# Patient Record
Sex: MALE | Race: WHITE | NOT HISPANIC OR LATINO
[De-identification: names, ages, dates, MRNs, and addresses within clinical notes are randomized per-mention and may not be internally consistent; named-entity substitution may affect disease eponyms.]

---

## 2017-07-13 PROBLEM — Z00.00 ENCOUNTER FOR PREVENTIVE HEALTH EXAMINATION: Status: ACTIVE | Noted: 2017-07-13

## 2017-07-17 ENCOUNTER — APPOINTMENT (OUTPATIENT)
Dept: COLORECTAL SURGERY | Facility: CLINIC | Age: 62
End: 2017-07-17

## 2018-05-15 ENCOUNTER — APPOINTMENT (OUTPATIENT)
Dept: PAIN MANAGEMENT | Facility: CLINIC | Age: 63
End: 2018-05-15

## 2020-09-10 ENCOUNTER — APPOINTMENT (OUTPATIENT)
Dept: CARDIOLOGY | Facility: CLINIC | Age: 65
End: 2020-09-10
Payer: MEDICARE

## 2020-09-10 ENCOUNTER — NON-APPOINTMENT (OUTPATIENT)
Age: 65
End: 2020-09-10

## 2020-09-10 VITALS
RESPIRATION RATE: 16 BRPM | TEMPERATURE: 98 F | DIASTOLIC BLOOD PRESSURE: 97 MMHG | HEART RATE: 64 BPM | WEIGHT: 186 LBS | HEIGHT: 73 IN | SYSTOLIC BLOOD PRESSURE: 162 MMHG | BODY MASS INDEX: 24.65 KG/M2 | OXYGEN SATURATION: 98 %

## 2020-09-10 DIAGNOSIS — Z87.891 PERSONAL HISTORY OF NICOTINE DEPENDENCE: ICD-10-CM

## 2020-09-10 DIAGNOSIS — Z78.9 OTHER SPECIFIED HEALTH STATUS: ICD-10-CM

## 2020-09-10 DIAGNOSIS — G90.50 COMPLEX REGIONAL PAIN SYNDROME I, UNSPECIFIED: ICD-10-CM

## 2020-09-10 DIAGNOSIS — Z01.818 ENCOUNTER FOR OTHER PREPROCEDURAL EXAMINATION: ICD-10-CM

## 2020-09-10 DIAGNOSIS — M79.606 PAIN IN LEG, UNSPECIFIED: ICD-10-CM

## 2020-09-10 DIAGNOSIS — K40.90 UNILATERAL INGUINAL HERNIA, W/OUT OBSTRUCTION OR GANGRENE, NOT SPECIFIED AS RECURRENT: ICD-10-CM

## 2020-09-10 DIAGNOSIS — Z86.718 PERSONAL HISTORY OF OTHER VENOUS THROMBOSIS AND EMBOLISM: ICD-10-CM

## 2020-09-10 PROCEDURE — 93000 ELECTROCARDIOGRAM COMPLETE: CPT

## 2020-09-10 PROCEDURE — 99204 OFFICE O/P NEW MOD 45 MIN: CPT

## 2020-09-11 NOTE — REASON FOR VISIT
[FreeTextEntry1] : Patient presents here for cardiac evaluation anticipating a left inguinal hernia repair in the near future at [facility] with Dr. Willie Jimenez.  \par \par The patient has no preexisting history of any cardiovascular disease, however, has been carrying the diagnosis of hypertension, currently on no medical therapy and some borderline hyperlipidemia.  \par \par He denies any family history of premature coronary artery disease, smoking history, diabetes. \par \par He exercises avidly and regularly without any limitation whatsoever.  He denies any chest pain, palpitations, PND, orthopnea, edema, syncope or near-syncope. \par \par He is unware of what his home blood pressure runs.  \par \par He did undergo an echocardiogram at an outpatient facility where he was found to have some mild biatrial and right ventricular enlargement. \par \par The patient denies any other untoward cardiac symptoms or medical history of significance.  His major medical history is that of RSD in the right leg.  \par

## 2020-09-11 NOTE — ASSESSMENT
[FreeTextEntry1] : \par ECG shows normal sinus rhythm at 64 bpm.  Right ventricular conduction delay with slightly delayed R-wave progression.  No significant ST-T wave changes.  \par \par Laboratory data 7/6/20: \par Cholesterol 199\par HDL 44\par \par AIC 4.9\par Hemoglobin 15.9\par Creatinine 0.9\par \par Echocardiogram 7/9/20 [facility]:  LV at 50-55%.  Mild left atrial and right atrial enlargement.  Moderate right ventricular enlargement.  No significant valvular disease. \par \par Impression:\par 1.  65-year-old male with vigorously active lifestyle and no cardiac symptoms whatsoever. \par 2.  Blood pressure that probably is a bit elevated, currently on no pharmacotherapy, but has not been well-     documented as an outpatient and at home. \par 3.  Some borderline mild hyperlipidemia.\par 4.  Preop for a low-risk left inguinal herniorrhaphy. \par \par The patient does not have any obvious cardiac contraindication proceeding with this low-risk procedure. \par \par To determine whether or not he requires antihypertensive therapy, I suggested that he purchase a home monitor and create a 2-3 week list of blood pressures and averages of systolic and diastolic and report back in with them.   \par The abnormalities seen on the echocardiogram are mild and certainly do not prohibit his proceeding with the surgery. \par \par A stress test at this point in time, especially with a hernia, would not have any significant role in risk stratifying with regard to this low-risk procedure in a patient who is himself at low risk. \par \par I do think it might be reasonable for him to have a full exercise stress test once he is convalesced and healed from the surgery just to make sure that his exercise does not create an undue hypertensive blood pressure response. \par \par I explained all of the above to the patient and for right now, he is considered cardiovascular stable for herniorrhaphy. \par \par He will follow up with me with regard to his blood pressure measurements at home and determination of antihypertensive therapy preoperatively. \par \par He will watch his diet more carefully and reassess lipid management at a later date. \par \par A repeat echocardiogram is planned for summer of 2021.  I hope that this information is useful to you.  Feel free to contact me with regard to any questions. \par \par   \par    \par

## 2020-09-28 ENCOUNTER — RESULT REVIEW (OUTPATIENT)
Age: 65
End: 2020-09-28

## 2021-02-05 ENCOUNTER — APPOINTMENT (OUTPATIENT)
Dept: CARDIOLOGY | Facility: CLINIC | Age: 66
End: 2021-02-05

## 2021-02-15 ENCOUNTER — APPOINTMENT (OUTPATIENT)
Dept: CARDIOLOGY | Facility: CLINIC | Age: 66
End: 2021-02-15
Payer: MEDICARE

## 2021-02-15 VITALS
SYSTOLIC BLOOD PRESSURE: 160 MMHG | HEIGHT: 73 IN | WEIGHT: 184 LBS | OXYGEN SATURATION: 98 % | BODY MASS INDEX: 24.39 KG/M2 | TEMPERATURE: 97.5 F | RESPIRATION RATE: 16 BRPM | HEART RATE: 132 BPM | DIASTOLIC BLOOD PRESSURE: 87 MMHG

## 2021-02-15 PROCEDURE — 99215 OFFICE O/P EST HI 40 MIN: CPT

## 2021-02-15 PROCEDURE — 99072 ADDL SUPL MATRL&STAF TM PHE: CPT

## 2021-02-15 PROCEDURE — 93000 ELECTROCARDIOGRAM COMPLETE: CPT

## 2021-02-15 NOTE — REASON FOR VISIT
[FreeTextEntry1] : Patient presents here for cardiac evaluation after a left ear excision of a basal cell carcinoma repaired with a skin graft.  Procedure just performed 3 days ago on 2/12/2021 and was without cardiac incident or difficulty.\par Denies any postoperative chest pain shortness of breath palpitations or dizziness.\par \par He previously tolerated a left inguinal hernia repair in the near future at [facility] with Dr. Willie Jimenez.  \par \par The patient has no preexisting history of any cardiovascular disease, however, has been carrying the diagnosis of hypertension, currently on no medical therapy and some borderline hyperlipidemia.  \par \par He denies any family history of premature coronary artery disease, smoking history, diabetes. \par \par He exercises avidly and regularly without any limitation whatsoever.  He denies any chest pain, palpitations, PND, orthopnea, edema, syncope or near-syncope. \par \par He is unware of what his home blood pressure runs.  \par \par He did undergo an echocardiogram at an outpatient facility where he was found to have some mild biatrial and right ventricular enlargement. \par \par The patient denies any other untoward cardiac symptoms or medical history of significance.  His major medical history is that of RSD in the right leg.  \par

## 2021-02-15 NOTE — PHYSICAL EXAM
[FreeTextEntry1] :                    Well appearing and nourished with no obvious deformities or distress.\par \par Eyes: \par No conjunctival injection and no xanthelasmas.\par HEENT: \par Left ear with obvious postoperative findings from the excision and skin graft               normocephalic.Normal oral mucosa. No pallor or cyanosis\par Neck: \par No jugular venous distension. with normal A and V wave forms. No palpable adenopathy.\par Cardiovascular: \par Rapid and irregular S1, S2 and a grade 1/6 systolic murmur. Distal arterial pulses are normal. No significant peripheral edema.\par Pulmonary: \par Lungs are clear to auscultation and percussion. Normal respiratory pattern without any accessory muscle use\par Abdomen: \par Soft, non-tender ; no palpable organomegaly or masses.\par Extremities:\par No digital clubbing, cyanosis or ischemic changes.\par Skin: \par No skin lesions, rashes, ulcers or xanthomas.\par Psychiatric: \par Alert and oriented to person, place and time. Appropriate mood and affect.

## 2021-02-15 NOTE — HISTORY OF PRESENT ILLNESS
[FreeTextEntry1] : Home blood pressure record over the last 2 months reflects systolics generally ranging in the 130s to 150 range with an average systolic of 139

## 2021-02-15 NOTE — ASSESSMENT
[FreeTextEntry1] : \par ECG atrial fibrillation ventricular rate of 132 and minor T wave normalities.  Delayed R wave progression.\par \par Laboratory data 7/6/20: \par Cholesterol 199\par HDL 44\par \par AIC 4.9\par Hemoglobin 15.9\par Creatinine 0.9\par \par Echocardiogram 7/9/20 [facility]:  LV at 50-55%.  Mild left atrial and right atrial enlargement.  Moderate right ventricular enlargement.  No significant valvular disease. \par \par Impression:\par 1.  Postop left ear Mohs procedure 3 days ago with skin grafting.\par \par 2.  Asymptomatic new onset atrial fibrillation with about rapid ventricular rate.\par \par 2.  Blood pressure that probably is a bit elevated, currently on no pharmacotherapy, but has not been well-     documented as an outpatient and at home. \par \par 3.  Some borderline mild hyperlipidemia.\par \par \par Plan:\par 1.  Because of the elevated blood pressure now well documented and the new onset atrial fibrillation with rapid ventricular rate will begin Toprol-XL 50 mg p.o. daily.\par \par 2.  Would like to at least begin but the patient back on aspirin daily, over, but asked him to discuss this with his plastic surgeon first.\par \par 3.  Uncertain whether the patient is steadily in A. fib or having paroxysmal bouts will use Holter monitor to sort this out\par \par 4.  Clinical follow-up here within just a few weeks.\par   \par    \par

## 2021-02-23 ENCOUNTER — NON-APPOINTMENT (OUTPATIENT)
Age: 66
End: 2021-02-23

## 2021-02-23 RX ORDER — ASPIRIN 81 MG/1
81 TABLET, COATED ORAL
Refills: 0 | Status: DISCONTINUED | COMMUNITY
End: 2021-02-23

## 2021-02-23 RX ORDER — ASPIRIN 325 MG/1
325 TABLET, FILM COATED ORAL DAILY
Qty: 90 | Refills: 3 | Status: ACTIVE | COMMUNITY

## 2021-03-22 RX ORDER — METOPROLOL SUCCINATE 50 MG/1
50 TABLET, EXTENDED RELEASE ORAL DAILY
Qty: 90 | Refills: 3 | Status: DISCONTINUED | COMMUNITY
Start: 2021-02-15 | End: 2021-03-22

## 2021-03-24 ENCOUNTER — APPOINTMENT (OUTPATIENT)
Dept: CARDIOLOGY | Facility: CLINIC | Age: 66
End: 2021-03-24
Payer: MEDICARE

## 2021-03-24 VITALS
SYSTOLIC BLOOD PRESSURE: 180 MMHG | TEMPERATURE: 97.5 F | WEIGHT: 185 LBS | BODY MASS INDEX: 24.52 KG/M2 | DIASTOLIC BLOOD PRESSURE: 80 MMHG | OXYGEN SATURATION: 97 % | HEIGHT: 73 IN | RESPIRATION RATE: 16 BRPM | HEART RATE: 85 BPM

## 2021-03-24 PROCEDURE — 99072 ADDL SUPL MATRL&STAF TM PHE: CPT

## 2021-03-24 PROCEDURE — 99214 OFFICE O/P EST MOD 30 MIN: CPT

## 2021-03-24 PROCEDURE — 93000 ELECTROCARDIOGRAM COMPLETE: CPT

## 2021-03-24 RX ORDER — ASCORBIC ACID 500 MG
TABLET ORAL
Refills: 0 | Status: ACTIVE | COMMUNITY

## 2021-03-24 RX ORDER — CEPHALEXIN 250 MG/1
250 CAPSULE ORAL
Qty: 28 | Refills: 0 | Status: DISCONTINUED | COMMUNITY
Start: 2021-02-12 | End: 2021-03-24

## 2021-03-24 NOTE — REASON FOR VISIT
[FreeTextEntry1] : Patient presents here for cardiac evaluation of hypertension and atrial fibrillation.\par We attempted to start him on Toprol but he stopped after just 1 day complaining of headaches and dizziness.\par Was to have had an event monitor but this apparently just arrived to day and therefore there are no readings.\par States that he feels relatively well describes occasional palpitations but no shortness of breath.\par Has a list of blood pressures with him that seem to be averaging about 140/75-80.  \par \par He is status post left ear excision of a basal cell carcinoma repaired with a skin graft  performed on 2/12/2021 and was without cardiac incident or difficulty.\par Denies any postoperative chest pain shortness of breath palpitations or dizziness.\par \par He previously tolerated a left inguinal hernia repair in the near future at [facility] with Dr. Willie Jimenez.  \par \par The patient has no preexisting history of any cardiovascular disease, however, has been carrying the diagnosis of hypertension, currently on no medical therapy and some borderline hyperlipidemia.  \par \par He denies any family history of premature coronary artery disease, smoking history, diabetes. \par \par He exercises avidly and regularly without any limitation whatsoever.  He denies any chest pain, palpitations, PND, orthopnea, edema, syncope or near-syncope. \par \par He is unware of what his home blood pressure runs.  \par \par He did undergo an echocardiogram at an outpatient facility where he was found to have some mild biatrial and right ventricular enlargement. \par \par The patient denies any other untoward cardiac symptoms or medical history of significance.  His major medical history is that of RSD in the right leg.  \par

## 2021-03-24 NOTE — ASSESSMENT
[FreeTextEntry1] : \par ECG atrial fibrillation ventricular rate of 90 and minor T wave normalities.  Delayed R wave progression.\par \par Laboratory data 7/6/20: \par Cholesterol 199\par HDL 44\par \par AIC 4.9\par Hemoglobin 15.9\par Creatinine 0.9\par \par Echocardiogram 7/9/20 [facility]:  LV at 50-55%.  Mild left atrial and right atrial enlargement.  Moderate right ventricular enlargement.  No significant valvular disease. \par \par Impression:\par 1.  Postop left ear Mohs procedure  with skin grafting.\par \par 2.  Asymptomatic new onset atrial fibrillation with better ventricular rate on no AV alejandrina blocking medication.\par     Taking aspirin alone as blood thinner.\par \par 2.  Blood pressure that probably is a bit elevated, currently on no pharmacotherapy\par \par 3.  Some borderline mild hyperlipidemia.\par \par \par Plan:\par 1.  Because of the elevated blood pressure now well documented and the new onset atrial fibrillation we will try carvedilol 6.25 twice daily.\par \par 2.  Now that he has sufficiently healed postop would like to begin Eliquis 5 p.o. twice daily for anticoagulation purposes.  Consideration also been given to the need for a cardioversion that will be performed in the future.\par \par 3.  Uncertain whether the patient is steadily in A. fib or having paroxysmal bouts.\par      event monitor is going to be used to sort this out.\par \par 4.  Clinical follow-up here within just a few weeks.\par   \par    \par

## 2021-04-08 ENCOUNTER — APPOINTMENT (OUTPATIENT)
Dept: CARDIOLOGY | Facility: CLINIC | Age: 66
End: 2021-04-08
Payer: MEDICARE

## 2021-04-08 PROCEDURE — 99072 ADDL SUPL MATRL&STAF TM PHE: CPT

## 2021-04-08 PROCEDURE — 93306 TTE W/DOPPLER COMPLETE: CPT

## 2021-04-21 ENCOUNTER — APPOINTMENT (OUTPATIENT)
Dept: CARDIOLOGY | Facility: CLINIC | Age: 66
End: 2021-04-21

## 2021-06-29 ENCOUNTER — RX RENEWAL (OUTPATIENT)
Age: 66
End: 2021-06-29

## 2021-07-12 ENCOUNTER — APPOINTMENT (OUTPATIENT)
Dept: CARDIOLOGY | Facility: CLINIC | Age: 66
End: 2021-07-12
Payer: MEDICARE

## 2021-07-12 VITALS
HEIGHT: 73 IN | HEART RATE: 62 BPM | WEIGHT: 185 LBS | OXYGEN SATURATION: 97 % | BODY MASS INDEX: 24.52 KG/M2 | RESPIRATION RATE: 16 BRPM | DIASTOLIC BLOOD PRESSURE: 78 MMHG | SYSTOLIC BLOOD PRESSURE: 120 MMHG

## 2021-07-12 PROCEDURE — 93000 ELECTROCARDIOGRAM COMPLETE: CPT

## 2021-07-12 PROCEDURE — 99072 ADDL SUPL MATRL&STAF TM PHE: CPT

## 2021-07-12 PROCEDURE — 99214 OFFICE O/P EST MOD 30 MIN: CPT

## 2021-07-12 RX ORDER — OMEGA-3/DHA/EPA/FISH OIL 910-1400MG
CAPSULE ORAL
Refills: 0 | Status: DISCONTINUED | COMMUNITY
End: 2021-07-12

## 2021-07-12 RX ORDER — ICOSAPENT ETHYL 1000 MG/1
1 CAPSULE ORAL
Refills: 0 | Status: DISCONTINUED | COMMUNITY
End: 2021-07-12

## 2021-07-12 RX ORDER — APIXABAN 5 MG/1
5 TABLET, FILM COATED ORAL
Qty: 180 | Refills: 3 | Status: DISCONTINUED | COMMUNITY
Start: 2021-03-24 | End: 2021-07-12

## 2021-07-12 RX ORDER — CARVEDILOL 6.25 MG/1
6.25 TABLET, FILM COATED ORAL TWICE DAILY
Qty: 180 | Refills: 0 | Status: DISCONTINUED | COMMUNITY
Start: 2021-03-24 | End: 2021-07-12

## 2021-07-12 NOTE — REASON FOR VISIT
[FreeTextEntry1] : Patient presents here for cardiac re- evaluation of hypertension and atrial fibrillation.\par We initially attempted to start him on Toprol but he stopped after just 1 day complaining of headaches and dizziness.\par At the last visit we had begun him on Eliquis 5 twice daily and carvedilol 6.25 twice daily.\par He decided to take neither.  He is very adamant about trying to manage his health issues without the assistance of medications.\par \par BP log for review seems to be ranging in the 130s /70s. Only on ASA , OTC cod liver and OTC mag.  Attributes any escalation to Carlton walker.\par \par HX of PAF and declines AC therapy at this time even though he understands the risk of doing so. 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 Willie Jimenez.  \par \par The patient has no preexisting history of any cardiovascular disease, however, has been carrying the diagnosis of hypertension, currently on no medical therapy and some borderline hyperlipidemia.  \par \par He denies any family history of premature coronary artery disease, smoking history, diabetes. \par \par He exercises avidly and regularly without any limitation whatsoever.  He denies any chest pain, palpitations, PND, orthopnea, edema, syncope or near-syncope. \par \par He is unware of what his home blood pressure runs.  \par \par He did undergo an echocardiogram at an outpatient facility where he was found to have some mild biatrial and right ventricular enlargement. \par \par The patient denies any other untoward cardiac symptoms or medical history of significance.  His major medical history is that of RSD in the right leg.  \par

## 2021-07-12 NOTE — HISTORY OF PRESENT ILLNESS
[FreeTextEntry1] : 2 week holter to reassess afib did not show any\par \par Results of his echo will be discussed.

## 2021-07-12 NOTE — ASSESSMENT
[FreeTextEntry1] : ECG normal sinus rhythm at rate of 62  Delayed R wave progression.\par \par Echo 4/14/21\par EF 60%\par Mild to mod. dilated left atrium\par Mildly dilated right atrium\par Mild mitral valve regurgitation \par Moderate aortic regurgitation \par Mild tricuspid regurgitation \par Mild dilation of the aortic root (3.9 cm) and ascending aorta ( 4.2cm).\par Small PFO with left to right shunting\par \par \par Laboratory data 7/6/20: \par Cholesterol 199\par HDL 44\par \par AIC 4.9\par Hemoglobin 15.9\par Creatinine 0.9\par \par Echocardiogram 7/9/20 [facility]:  LV at 50-55%.  Mild left atrial and right atrial enlargement.  Moderate right ventricular enlargement.  No significant valvular disease. \par \par Impression:\par 1.  Postop left ear Mohs procedure  with skin grafting.  Appears fully healed\par \par 2.  HX of asymptomatic  onset atrial fibrillation now back in sinus rhythm and on no AV alejandrina blocking medication.\par     -Taking aspirin having refused Eliquis\par     -ECG today SR.\par \par 3. Echo with NL EF, moderate aortic valve regurgitation  and atrial enlargement probably related to his hypertension\par     -Ascending aorta dilation measuring 4.2 cm\par     -Aortic root dilation 3.9 cm\par     - PFO with left to right shunting.\par \par 4.  Currently on no pharmacotherapy for HTN. Numbers tend to escalate with ETOH, but for the most part stable. \par \par 5. HX of borderline mild hyperlipidemia but no recent blood work. \par \par \par Plan:\par 1.  Patient will continue to monitor his blood pressure carefully and submit a list of blood pressures with an average in 1 month.\par \par 2.  Patient urged to reconsider the role of anticoagulation therapy.  The associated risks of paroxysmal atrial fibrillation association with stroke were reviewed.\par \par 3.  Patient encouraged to avoid alcohol or any other stimulant which would provoke recurrence of atrial fibrillation.\par \par 4.  The role of a annual echocardiogram to reassess the ascending aortic dilatation and perhaps the need to obtain an abdominal aortic sonogram at follow-up discussed.\par \par   \par    \par

## 2021-10-11 ENCOUNTER — APPOINTMENT (OUTPATIENT)
Dept: CARDIOLOGY | Facility: CLINIC | Age: 66
End: 2021-10-11
Payer: MEDICARE

## 2021-10-11 VITALS
WEIGHT: 184 LBS | SYSTOLIC BLOOD PRESSURE: 158 MMHG | HEIGHT: 73 IN | HEART RATE: 56 BPM | RESPIRATION RATE: 16 BRPM | DIASTOLIC BLOOD PRESSURE: 80 MMHG | OXYGEN SATURATION: 98 % | BODY MASS INDEX: 24.39 KG/M2

## 2021-10-11 DIAGNOSIS — I48.0 PAROXYSMAL ATRIAL FIBRILLATION: ICD-10-CM

## 2021-10-11 DIAGNOSIS — E78.5 HYPERLIPIDEMIA, UNSPECIFIED: ICD-10-CM

## 2021-10-11 DIAGNOSIS — I10 ESSENTIAL (PRIMARY) HYPERTENSION: ICD-10-CM

## 2021-10-11 PROCEDURE — 99214 OFFICE O/P EST MOD 30 MIN: CPT

## 2021-10-11 PROCEDURE — 93000 ELECTROCARDIOGRAM COMPLETE: CPT

## 2021-10-11 NOTE — REASON FOR VISIT
[FreeTextEntry1] : Patient presents here for cardiac re- evaluation of hypertension and P. atrial fibrillation.\par We initially attempted to start him on Toprol but he stopped after just 1 day complaining of headaches and dizziness.\par At the last visit we had begun him on Eliquis 5 twice daily and carvedilol 6.25 twice daily.\par He decided to take neither.  He is very adamant about trying to manage his health issues without the assistance of medications.\par Complicating matters is the stress of his wife's recent diagnosis with MDS which apparently has taken over their lives.\par \par BP log for review seems to be ranging in the 130s /70s. Only on ASA , OTC cod liver and OTC mag.  Attributes any escalation to Carlton greer.\par There are periodic episodes of likely PAF when blood pressure drops lower into the 100-1 20 range with a heart rate that is over 100.  Tend to last about 30 minutes at a time\par \par HX of PAF and declines AC therapy at this time even though he understands the risk of doing so. 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 Willie Jimenez.  \par \par The patient has no preexisting history of any cardiovascular disease, however, has been carrying the diagnosis of hypertension, currently on no medical therapy and some borderline hyperlipidemia.  \par \par He denies any family history of premature coronary artery disease, smoking history, diabetes. \par \par He exercises avidly and regularly without any limitation whatsoever.  He denies any chest pain, palpitations, PND, orthopnea, edema, syncope or near-syncope. \par \par He is unware of what his home blood pressure runs.  \par \par He did undergo an echocardiogram at an outpatient facility where he was found to have some mild biatrial and right ventricular enlargement. \par \par The patient denies any other untoward cardiac symptoms or medical history of significance.  His major medical history is that of RSD in the right leg.  \par

## 2021-10-11 NOTE — ASSESSMENT
[FreeTextEntry1] : ECG normal sinus rhythm at rate of 56 first-degree AV block delayed R wave progression.\par \par Echo 4/14/21\par EF 60%\par Mild to mod. dilated left atrium\par Mildly dilated right atrium\par Mild mitral valve regurgitation \par Moderate aortic regurgitation \par Mild tricuspid regurgitation \par Mild dilation of the aortic root (3.9 cm) and ascending aorta ( 4.2cm).\par Small PFO with left to right shunting\par \par \par Laboratory data \par        7/6/20:      8/6/2021\par Cho   199           199\par HDL     44            36\par LDL    124           124\par AIC     4.9\par Hemoglobin 15.9\par Creatinine 0.9\par \par Echocardiogram 7/9/20 [facility]:  LV at 50-55%.  Mild left atrial and right atrial enlargement.  Moderate right ventricular enlargement.  No significant valvular disease. \par \par Impression:\par 1.  Postop left ear Mohs procedure  with skin grafting.  Appears fully healed\par \par 2.  HX of asymptomatic  onset atrial fibrillation \par      Primarily in sinus rhythm though suspect there are short intermittent runs of atrial fibrillation of which she is       minimally symptomatic\par      = Tried 1 beta-blocker Toprol and now refusing any other.\par     -Taking aspirin having refused Eliquis\par     -ECG today SR.\par \par 3. Echo with NL EF, moderate aortic valve regurgitation  and atrial enlargement probably related to his hypertension\par     -Ascending aorta dilation measuring 4.2 cm\par     -Aortic root dilation 3.9 cm\par     - PFO with left to right shunting.\par \par 4.  Currently on no pharmacotherapy for HTN. Numbers tend to escalate with ETOH, seen borderline elevated\par \par 5. HX of borderline mild hyperlipidemia stable and unchanged\par \par \par Plan:\par 1.  Patient will continue to monitor his blood pressure carefully and submit a list of blood pressures.\par \par 2.  Patient urged to reconsider the role of anticoagulation therapy.  The associated risks of paroxysmal atrial fibrillation association with stroke were reviewed.\par \par 3.  Patient encouraged to avoid alcohol or any other stimulant which would provoke recurrence of atrial fibrillation.\par \par 4.  The role of a annual echocardiogram to reassess the ascending aortic dilatation and perhaps the need to obtain an abdominal aortic sonogram at follow-up discussed.\par \par   5.  Encouraged him to purchase a home heart rate monitor such as [employer]ia and send the strips when his heart rate is elevated and blood pressure low.\par    \par

## 2021-10-11 NOTE — PHYSICAL EXAM
[FreeTextEntry1] :                    Well appearing and nourished with no obvious deformities or distress.\par \par Eyes: \par No conjunctival injection and no xanthelasmas.\par HEENT: \par Left ear with obvious postoperative findings from the excision and skin graft appears very well-healed             normocephalic.Normal oral mucosa. No pallor or cyanosis\par Neck: \par No jugular venous distension. with normal A and V wave forms. No palpable adenopathy.\par Cardiovascular: \par Rapid and irregular S1, S2 and a grade 1/6 systolic murmur. Distal arterial pulses are normal. No significant peripheral edema.\par Pulmonary: \par Lungs are clear to auscultation and percussion. Normal respiratory pattern without any accessory muscle use\par Abdomen: \par Soft, non-tender ; no palpable organomegaly or masses.\par Extremities:\par No digital clubbing, cyanosis or ischemic changes.\par Skin: \par No skin lesions, rashes, ulcers or xanthomas.\par Psychiatric: \par Alert and oriented to person, place and time. Appropriate mood and affect.

## 2021-10-11 NOTE — REVIEW OF SYSTEMS
[Weight Gain (___ Lbs)] : no recent weight gain [Weight Loss (___ Lbs)] : no recent weight loss [Anxiety] : anxiety [Under Stress] : under stress

## 2024-08-16 ENCOUNTER — NON-APPOINTMENT (OUTPATIENT)
Age: 69
End: 2024-08-16